# Patient Record
(demographics unavailable — no encounter records)

---

## 2024-10-19 NOTE — HISTORY OF PRESENT ILLNESS
[de-identified] : Fever [FreeTextEntry6] : Started 2 nights ago with stuffy and runny nose and fussy and restless sleep. Started to cough a bit.  Yesterday, after she came home from - had fever 100.5* and cranky and less appetite. Early this AM, T 100.5* and increased coughing- hacky and phlegmy cough- no gagging with the cough.  Increased nasal congestion. Occ pulling at her ears. No V/D/C/loose stools. No one else sick at home.  Possible sick contacts at .

## 2024-10-19 NOTE — DISCUSSION/SUMMARY
[FreeTextEntry1] : 7 mo/o F with LOM/Fever/ Fussy/ Cough/nasal congestion- Left cerumen impaction- ear wax removed- tolerated procedure well. Quick Strep negative Quick Flu negative for A and B Rapid COVID negative Amoxil 3.75 ml 2x/day with food for 10 days. May use Zarbees or Remedios's as needed. Increase clear fluids/ Steam/ Ices/Smoothies/Soups/Probiotics/Tylenol and/or Motrin as needed. Ques addressed. Father verbalizes understanding. Recheck in 2 weeks or sooner if needed. Check back if symptoms do not improve or worsen or if any questions/concerns. Time spent patient/chart - 34 mins.

## 2024-10-19 NOTE — REVIEW OF SYSTEMS
[Fussy] : fussy [Difficulty with Sleep] : difficulty with sleep [Fever] : fever [Nasal Discharge] : nasal discharge [Nasal Congestion] : nasal congestion [Cough] : cough [Appetite Changes] : appetite changes [Negative] : Skin

## 2024-10-19 NOTE — PHYSICAL EXAM
[No Acute Distress] : no acute distress [Alert] : alert [Normocephalic] : normocephalic [EOMI] : grossly EOMI [Clear] : right tympanic membrane clear [Clear Rhinorrhea] : clear rhinorrhea [Erythematous Oropharynx] : erythematous oropharynx [Supple] : supple [Clear to Auscultation Bilaterally] : clear to auscultation bilaterally [Wheezing] : no wheezing [Rhonchi] : no rhonchi [Regular Rate and Rhythm] : regular rate and rhythm [Soft] : soft [Moves All Extremities x 4] : moves all extremities x4 [Normotonic] : normotonic [Warm] : warm [FreeTextEntry3] : LOM

## 2024-10-22 NOTE — REVIEW OF SYSTEMS
[Nasal Congestion] : nasal congestion [Swollen Gums] : swollen gums [Appetite Changes] : appetite changes [Spitting Up] : spitting up [Negative] : Genitourinary

## 2024-10-23 NOTE — HISTORY OF PRESENT ILLNESS
[de-identified] : anaphylaxis to food [FreeTextEntry6] : 10/21/24 pt was taken to [facility] ER for allergic reaction.  ER note: Patient has a known milk and egg allergy. The patient was fed chicken and premade frozen vegetables and immediately had an outbreak of hives all around the body and had respiratory distress. Patient's mom states her daughters lips turned purple and she gave an Epipen at 1:20 p.m. and 5 mL of benadryl. The respiratory distress resolved and the baby was able to breathe more comfortably after the administration of the Epipen. The hives also improved once the ambulance had arrived. Upon presentation in the emergency room, the parents endorse that the rash has returned on the patients back, posterior neck, abdomen, legs, and genital region. The parents also endorse bumps on the top of the patients mouth. The parents endorse that the patient is currently being treated for a ear infection of the left ear with Amoxicillin over the past two days, since 10/19/24. Pt has apt with allergist at [facility] 10/23/24 as new pt. As per mother Allergist saw her today and vlad blood for allergies- .  Mother gave Amoxicillin Sunday and yesterday 10 AM.  She ate lunch then within 10 minutes of eating developed hives, lips were blue, wheezing and was given Epi Pen.  Ambulance was called and was brought to ER given Decadron.  Hx  Milk allergy and egg allergy as per mother when tested by allergist. Nasal congestion, no fever, cranky.  Spitting up due to mucus from nose.  Decreased appetite today.  No more hives.

## 2024-10-23 NOTE — HISTORY OF PRESENT ILLNESS
[de-identified] : anaphylaxis to food [FreeTextEntry6] : 10/21/24 pt was taken to [facility] ER for allergic reaction.  ER note: Patient has a known milk and egg allergy. The patient was fed chicken and premade frozen vegetables and immediately had an outbreak of hives all around the body and had respiratory distress. Patient's mom states her daughters lips turned purple and she gave an Epipen at 1:20 p.m. and 5 mL of benadryl. The respiratory distress resolved and the baby was able to breathe more comfortably after the administration of the Epipen. The hives also improved once the ambulance had arrived. Upon presentation in the emergency room, the parents endorse that the rash has returned on the patients back, posterior neck, abdomen, legs, and genital region. The parents also endorse bumps on the top of the patients mouth. The parents endorse that the patient is currently being treated for a ear infection of the left ear with Amoxicillin over the past two days, since 10/19/24. Pt has apt with allergist at [facility] 10/23/24 as new pt. As per mother Allergist saw her today and vlad blood for allergies- .  Mother gave Amoxicillin Sunday and yesterday 10 AM.  She ate lunch then within 10 minutes of eating developed hives, lips were blue, wheezing and was given Epi Pen.  Ambulance was called and was brought to ER given Decadron.  Hx  Milk allergy and egg allergy as per mother when tested by allergist. Nasal congestion, no fever, cranky.  Spitting up due to mucus from nose.  Decreased appetite today.  No more hives.

## 2024-10-23 NOTE — DISCUSSION/SUMMARY
[FreeTextEntry1] : 7 mo female with hx of milk and egg allergy here for hospital F/U of Anaphylaxis to food- on 10/21/24 10 min after eating chicken and mixed veg turned blue and wheezing and had hives- given Epi pen at home and taken by ambulance to  ER where she was given Decadron. She was also on Amoxicillin for LOM started 10/20/24 with congestion, cough.  The reaction was over 3 hours after the 3rd dose of Amoxicillin.  Pt seen by Allergist today and blood drawn for allergy testing.  She still has nasal congestion, cough, spitting up due to mucus. PE- LOS, nasal congestion, PNC cough, rest WNL.  Recommend to continue Amoxicillin x 10 d, nebulized saline PRN, saline and suction nose, small frequent meals.  Epi pen refill ordered.  F/U 11/01/24.

## 2024-10-23 NOTE — PHYSICAL EXAM
[Clear] : right tympanic membrane clear [Clear Effusion] : clear effusion [NL] : warm, clear [FreeTextEntry3] : LOS-cloudy effusion [FreeTextEntry4] : nasal congestion

## 2024-10-29 NOTE — HISTORY OF PRESENT ILLNESS
[de-identified] : ear recheck [FreeTextEntry6] : 8 m/o with hx of egg and milk allergy, with 2 recent episodes of anaphylaxis last one 10/21 as per mother she spoke with A&I who reports patient possible had allergic reaction to penicillin as patient started Amox on 9/19 for RAOM. Mother reports she only had 3 days worth of Amox and then stop as per A&I recommendations, since mother reports patient still fussy and pulling ear.

## 2024-11-05 NOTE — HISTORY OF PRESENT ILLNESS
[de-identified] : pulling ear and lesion on mouth [FreeTextEntry6] : 8 m/o with 1-day hx of pulling on Left ear and as per father he saw a sore on her mouth. Denies fever, vomiting, diarrhea , rash

## 2024-11-05 NOTE — HISTORY OF PRESENT ILLNESS
[de-identified] : pulling ear and lesion on mouth [FreeTextEntry6] : 8 m/o with 1-day hx of pulling on Left ear and as per father he saw a sore on her mouth. Denies fever, vomiting, diarrhea , rash

## 2024-12-06 NOTE — HISTORY OF PRESENT ILLNESS
[Well-balanced] : well-balanced [Normal] : Normal [Water heater temperature set at <120 degrees F] : Water heater temperature set at <120 degrees F [Rear facing car seat in  back seat] : Rear facing car seat in  back seat [Carbon Monoxide Detectors] : Carbon monoxide detectors [Smoke Detectors] : Smoke detectors [Mother] : mother [Formula ___ oz/feed] : [unfilled] oz of formula per feed [Formula ___ oz in 24 hrs] : [unfilled] oz of formula in 24 hours [Fruit] : fruit [Vegetables] : vegetables [Cereal] : cereal [Peanut] : peanut [Water] : water [___ voids per day] : [unfilled] voids per day [per day] : per day. [Loose] : loose consistency [In Crib] : sleeps in crib [On back] : sleeps on back [Wakes up at night] : wakes up at night [Pacifier use] : Pacifier use [Sippy Cup use] : sippy cup use [Toothpaste] : Primary Fluoride Source: Toothpaste [No] : Not at  exposure [Up to date] : Up to date [Meat] : no meat [Eggs] : no eggs [Fish] : no fish [Dairy] : no dairy [Co-sleeping] : no co-sleeping [Sleeps 12-16 hours per 24 hours (including naps)] : Does not sleep 12-16 hours per 24 hours (including naps) [Loose bedding, pillow, toys, and/or bumpers in crib] : no loose bedding, pillow, toys, and/or bumpers in crib [Bottle in bed] : not using bottle in bed [Brushing teeth] : not brushing teeth [Screen time only for video chatting] : screen time not just for video chatting [FreeTextEntry7] : No hospitalization/Surgeries, Specialist visit. [de-identified] : none [de-identified] : chicken,  [NO] : No

## 2024-12-06 NOTE — DEVELOPMENTAL MILESTONES
[Yes] : Completed. [Normal Development] : Normal Development [None] : none [Uses basic gestures] : uses basic gestures [Says "Jn" or "Mama"] : says "Jn" or "Mama" nonspecifically [Sits well without support] : sits well without support [Transitions between sitting and lying] : transitions between sitting and lying [Balances on hands and knees] : balances on hands and knees [Crawls] : crawls [Picks up small objects with 3 fingers] : picks up small objects with 3 fingers and thumb [Releases objects intentionally] : releases objects intentionally [Marble objects together] : bangs objects together [FreeTextEntry1] : Passed

## 2024-12-06 NOTE — DISCUSSION/SUMMARY
[Normal Growth] : growth [Normal Development] : development [None] : No known medical problems [No Elimination Concerns] : elimination [No Feeding Concerns] : feeding [No Skin Concerns] : skin [Normal Sleep Pattern] : sleep [Term Infant] : Term infant [Family Adaptation] : family adaptation [Infant Ray] : infant independence [Feeding Routine] : feeding routine [Safety] : safety [No Medications] : ~He/She~ is not on any medications [Parent/Guardian] : parent/guardian [] : The components of the vaccine(s) to be administered today are listed in the plan of care. The disease(s) for which the vaccine(s) are intended to prevent and the risks have been discussed with the caretaker.  The risks are also included in the appropriate vaccination information statements which have been provided to the patient's caregiver.  The caregiver has given consent to vaccinate.

## 2024-12-06 NOTE — PHYSICAL EXAM
[Alert] : alert [Normocephalic] : normocephalic [Flat Open Anterior [place]] : flat open anterior fontanelle [Red Reflex] : red reflex bilateral [PERRL] : PERRL [Normally Placed Ears] : normally placed ears [Auricles Well Formed] : auricles well formed [Clear Tympanic membranes] : clear tympanic membranes [Light reflex present] : light reflex present [Bony landmarks visible] : bony landmarks visible [Nares Patent] : nares patent [Palate Intact] : palate intact [Uvula Midline] : uvula midline [Supple, full passive range of motion] : supple, full passive range of motion [Symmetric Chest Rise] : symmetric chest rise [Clear to Auscultation Bilaterally] : clear to auscultation bilaterally [Regular Rate and Rhythm] : regular rate and rhythm [S1, S2 present] : S1, S2 present [+2 Femoral Pulses] : (+) 2 femoral pulses [Soft] : soft [Bowel Sounds] : bowel sounds present [Normal External Genitalia] : normal external genitalia [Normal Vaginal Introitus] : normal vaginal introitus [No Abnormal Lymph Nodes Palpated] : no abnormal lymph nodes palpated [Symmetric abduction and rotation of hips] : symmetric abduction and rotation of hips [Straight] : straight [Cranial Nerves Grossly Intact] : cranial nerves grossly intact [Acute Distress] : no acute distress [Excessive Tearing] : no excessive tearing [Discharge] : no discharge [Palpable Masses] : no palpable masses [Murmurs] : no murmurs [Tender] : nontender [Distended] : nondistended [Hepatomegaly] : no hepatomegaly [Splenomegaly] : no splenomegaly [Clitoromegaly] : no clitoromegaly [Allis Sign] : negative Allis sign [Rash or Lesions] : no rash/lesions

## 2024-12-09 NOTE — HISTORY OF PRESENT ILLNESS
[EENT/Resp Symptoms] : EENT/RESPIRATORY SYMPTOMS [Nasal congestion] : nasal congestion [Runny nose] : runny nose [Chest congestion] : chest congestion [___ Day(s)] : [unfilled] day(s) [Constant] : constant [Irritable] : irritable [Known exposure to COVID-19] : no known exposure to COVID-19 [Hx of recent COVID-19 infection] : no history of recent COVID-19 infection [Sick Contacts: ___] : no sick contacts [Clear rhinorrhea] : clear rhinorrhea [Wet cough] : wet cough [At Night] : at night [In Morning] : in morning [Humidifier] : humidifier [Nasal saline] : nasal saline [Nasal suctioning] : nasal suctioning [Acetaminophen] : acetaminophen [Ibuprofen] : ibuprofen [Change in sleep pattern] : change in sleep pattern [Eye Redness] : no eye redness [Eye Discharge] : no eye discharge [Ear Tugging] : no ear tugging [Runny Nose] : runny nose [Nasal Congestion] : nasal congestion [Teething] : no teething [Cough] : cough [Wheezing] : wheezing [Decreased Appetite] : decreased appetite [Posttussive emesis] : no posttussive emesis [Vomiting] : no vomiting [Diarrhea] : no diarrhea [Decreased Urine Output] : no decreased urine output [Rash] : no rash [Stable] : stable

## 2024-12-11 NOTE — HISTORY OF PRESENT ILLNESS
[de-identified] : RSV bronchiolitis  [FreeTextEntry6] : 9 m/o with DOI 3, with RSV + bronchiolitis, on albuterol and orapred  for 2 days as per mother last night patient breathing fast and decrease appetite.

## 2024-12-11 NOTE — PHYSICAL EXAM
[Alert] : alert [Irritable] : irritable [Consolable] : consolable [Rhonchi] : rhonchi [Subcostal Retractions] : subcostal retractions [NL] : warm, clear

## 2024-12-13 NOTE — HISTORY OF PRESENT ILLNESS
[de-identified] : bronchiolitis recheck [FreeTextEntry6] : 9 m/o with RSV + bronchiolitis on albuterol nebs q4hr, orapred to complete 5 days and Zithromax to complete 5 days, still coughing, congestion and decrease appetite.

## 2024-12-20 NOTE — HISTORY OF PRESENT ILLNESS
[de-identified] : lungs f/u [FreeTextEntry6] : 9 m/o with RSV pneumonia s/p albuterol nebulizer treatment, orapred and Zithromax doing better still congestion and decrease appetitie.

## 2025-01-09 NOTE — REVIEW OF SYSTEMS
[Fussy] : fussy [Malaise] : malaise [Difficulty with Sleep] : difficulty with sleep [Fever] : fever [Nasal Congestion] : nasal congestion [Cough] : cough [Appetite Changes] : appetite changes [Negative] : Skin

## 2025-01-09 NOTE — DISCUSSION/SUMMARY
[FreeTextEntry1] : 10 mo/o F with Viral illness/Fever/Fussy/Malaise/Nasal congestion- Quick Strep negative Quick Flu negative for A and B Rapid COVID negative T/C sent Flu panel sent May use Zarbees or Remedios's as needed. Increase clear fluids/ Lukewarm sponge baths/Pedialyte// Ices/Smoothies/Soups/Probiotics/Tylenol and/or Motrin as needed. Ques addressed. Mother verbalizes understanding. Check back if symptoms do not improve or worsen or if any questions/concerns. Time spent patient/chart - 34 mins. Jeannette

## 2025-01-09 NOTE — HISTORY OF PRESENT ILLNESS
[de-identified] : Fever [FreeTextEntry6] : Started yesterday being a little cranky and little restless sleep and whining and was 102.6* at 6 AM. She seemed achy.  Started to have a hacky cough and some nasal congestion. Has been pulling at her ears.  Decreased appetite. No V/D/C/loose stools. She has had exposure to COVID and Flu.

## 2025-01-09 NOTE — PHYSICAL EXAM
[Alert] : alert [EOMI] : grossly EOMI [Clear] : right tympanic membrane clear [Clear Rhinorrhea] : clear rhinorrhea [Erythematous Oropharynx] : erythematous oropharynx [Clear to Auscultation Bilaterally] : clear to auscultation bilaterally [Regular Rate and Rhythm] : regular rate and rhythm [Soft] : soft [Moves All Extremities x 4] : moves all extremities x4 [Normotonic] : normotonic [Warm] : warm [No Acute Distress] : no acute distress [Supple] : not supple [Wheezing] : no wheezing [Rales] : no rales [Rhonchi] : no rhonchi [Tender] : nontender

## 2025-01-15 NOTE — BEGINNING OF VISIT
Florinda informed with message listed below and understands verbal instructions given.  Florinda said that she will relay message to group home.   [Mother] : mother

## 2025-01-15 NOTE — HISTORY OF PRESENT ILLNESS
[de-identified] : Flu A f/u [FreeTextEntry6] : 10 m/o seen 1/9 for Flu A as well went to ER  1/10 to [facility] for high fever, no testing done. Denies fever, or cough but still with decreased appetite, tolerating fluids and milk.

## 2025-01-15 NOTE — HISTORY OF PRESENT ILLNESS
[de-identified] : Flu A f/u [FreeTextEntry6] : 10 m/o seen 1/9 for Flu A as well went to ER  1/10 to [facility] for high fever, no testing done. Denies fever, or cough but still with decreased appetite, tolerating fluids and milk.

## 2025-03-01 NOTE — DISCUSSION/SUMMARY
[FreeTextEntry1] : 12 mo/o F with LOM/Fussy/Cough/Nasal congestion- Omnicef 250 mg/tsp 3 ml 1x/day with food for 10 days. May use Zarbees or Remedios's as needed. May use NS in nebulizer as needed Increase clear fluids/Steam/Lukewarm sponge baths/ Ices/Smoothies/Soups/Probiotics/Tylenol and/or Motrin as needed. Ques addressed. Mother verbalizes understanding. Recheck in 2 weeks or sooner if needed. Check back if symptoms do not improve or worsen or if any questions/concerns. Time spent patient/chart - 33 mins.

## 2025-03-01 NOTE — PHYSICAL EXAM
[Acute Distress] : no acute distress [Alert] : alert [Normocephalic] : normocephalic [EOMI] : grossly EOMI [Clear Rhinorrhea] : clear rhinorrhea [Erythematous Oropharynx] : nonerythematous oropharynx [Supple] : supple [Clear to Auscultation Bilaterally] : clear to auscultation bilaterally [Wheezing] : no wheezing [Rales] : no rales [Rhonchi] : no rhonchi [Regular Rate and Rhythm] : regular rate and rhythm [Soft] : soft [Moves All Extremities x 4] : moves all extremities x4 [Normotonic] : normotonic [Warm] : warm [FreeTextEntry3] : LOM- noted after cerumen removal/Right NL

## 2025-03-01 NOTE — HISTORY OF PRESENT ILLNESS
[de-identified] : Fever/Cough/Congestion [FreeTextEntry6] : Started yesterday with hacky and phlegmy cough and started to get a stuffy and runny nose. Fever 4 days ago after Prevnar to 100.8* X 1. Restless sleep. Occ gagging with the cough.  Mother has had a cold. No pulling at her ears. Little fussy. No V/D/C/loose stools. Less appetite. Had CP 4 days ago.

## 2025-03-01 NOTE — REVIEW OF SYSTEMS
[Fussy] : fussy [Difficulty with Sleep] : difficulty with sleep [Nasal Discharge] : nasal discharge [Nasal Congestion] : nasal congestion [Cough] : cough [Appetite Changes] : appetite changes [Negative] : Skin

## 2025-03-07 NOTE — HISTORY OF PRESENT ILLNESS
[Normal] : Normal [Water heater temperature set at <120 degrees F] : Water heater temperature set at <120 degrees F [Car seat in back seat] : Car seat in back seat [Smoke Detectors] : Smoke detectors [Carbon Monoxide Detectors] : Carbon monoxide detectors [Mother] : mother [Formula ___ oz/feed] : [unfilled] oz of formula per feed [Cow's milk ___ oz/feed] : [unfilled] oz of Cow's milk per feed [Fruit] : fruit [Vegetables] : vegetables [Meat] : meat [Baby food] : baby food [Finger food] : finger food [Table food] : table food [___ stools per day] : [unfilled]  stools per day [___ stools every other day] : [unfilled]  stools every other day [___ voids per day] : [unfilled] voids per day [In crib] : In crib [Wakes up at night] : Wakes up at night [Sippy cup use] : Sippy cup use [Toothpaste] : Primary Fluoride Source: Toothpaste [No] : Not at  exposure [Up to date] : Up to date [NO] : No [Playtime] : Playtime  [At risk for exposure to TB] : Not at risk for exposure to Tuberculosis [FreeTextEntry7] : No hospitalization/Surgeries, Specialist visit. [de-identified] : nasal congestion,

## 2025-03-07 NOTE — PHYSICAL EXAM
[Alert] : alert [Normocephalic] : normocephalic [Closed Anterior [place]] : closed anterior fontanelle [Red Reflex] : red reflex bilateral [PERRL] : PERRL [Normally Placed Ears] : normally placed ears [Auricles Well Formed] : auricles well formed [Clear Tympanic membranes] : clear tympanic membranes [Light reflex present] : light reflex present [Bony landmarks visible] : bony landmarks visible [Nares Patent] : nares patent [Palate Intact] : palate intact [Uvula Midline] : uvula midline [Tooth Eruption] : tooth eruption [Supple, full passive range of motion] : supple, full passive range of motion [Symmetric Chest Rise] : symmetric chest rise [Clear to Auscultation Bilaterally] : clear to auscultation bilaterally [Regular Rate and Rhythm] : regular rate and rhythm [S1, S2 present] : S1, S2 present [+2 Femoral Pulses] : (+) 2 femoral pulses [Soft] : soft [Bowel Sounds] : normoactive bowel sounds [Normal External Genitalia] : normal external genitalia [Normal Vaginal Introitus] : normal vaginal introitus [No Abnormal Lymph Nodes Palpated] : no abnormal lymph nodes palpated [Symmetric Abduction and Rotation of Hips] : symmetric abduction and rotation of hips [Straight] : straight [Cranial Nerves Grossly Intact] : cranial nerves grossly intact [Discharge] : no discharge [Palpable Masses] : no palpable masses [Murmurs] : no murmurs [Tender] : nontender [Distended] : nondistended [Hepatomegaly] : no hepatomegaly [Splenomegaly] : no splenomegaly [Clitoromegaly] : no clitoromegaly [Allis Sign] : negative Allis sign [Rash or Lesions] : no rash/lesions

## 2025-03-07 NOTE — PHYSICAL EXAM
[Alert] : alert [Normocephalic] : normocephalic [Closed Anterior [place]] : closed anterior fontanelle [Red Reflex] : red reflex bilateral [PERRL] : PERRL [Normally Placed Ears] : normally placed ears [Auricles Well Formed] : auricles well formed [Clear Tympanic membranes] : clear tympanic membranes [Light reflex present] : light reflex present [Bony landmarks visible] : bony landmarks visible [Nares Patent] : nares patent [Palate Intact] : palate intact [Uvula Midline] : uvula midline [Tooth Eruption] : tooth eruption [Supple, full passive range of motion] : supple, full passive range of motion [Symmetric Chest Rise] : symmetric chest rise [Clear to Auscultation Bilaterally] : clear to auscultation bilaterally [Regular Rate and Rhythm] : regular rate and rhythm [S1, S2 present] : S1, S2 present [+2 Femoral Pulses] : (+) 2 femoral pulses [Soft] : soft [Bowel Sounds] : normoactive bowel sounds [Normal External Genitalia] : normal external genitalia [Normal Vaginal Introitus] : normal vaginal introitus [No Abnormal Lymph Nodes Palpated] : no abnormal lymph nodes palpated [Symmetric Abduction and Rotation of Hips] : symmetric abduction and rotation of hips [Straight] : straight [Cranial Nerves Grossly Intact] : cranial nerves grossly intact [Discharge] : no discharge [Palpable Masses] : no palpable masses [Murmurs] : no murmurs [Tender] : nontender [Distended] : nondistended [Hepatomegaly] : no hepatomegaly [Splenomegaly] : no splenomegaly [Clitoromegaly] : no clitoromegaly [Allis Sign] : negative Allis sign [Rash or Lesions] : no rash/lesions

## 2025-03-07 NOTE — HISTORY OF PRESENT ILLNESS
[Normal] : Normal [Water heater temperature set at <120 degrees F] : Water heater temperature set at <120 degrees F [Car seat in back seat] : Car seat in back seat [Smoke Detectors] : Smoke detectors [Carbon Monoxide Detectors] : Carbon monoxide detectors [Mother] : mother [Formula ___ oz/feed] : [unfilled] oz of formula per feed [Cow's milk ___ oz/feed] : [unfilled] oz of Cow's milk per feed [Fruit] : fruit [Vegetables] : vegetables [Meat] : meat [Baby food] : baby food [Finger food] : finger food [Table food] : table food [___ stools per day] : [unfilled]  stools per day [___ stools every other day] : [unfilled]  stools every other day [___ voids per day] : [unfilled] voids per day [In crib] : In crib [Wakes up at night] : Wakes up at night [Sippy cup use] : Sippy cup use [Toothpaste] : Primary Fluoride Source: Toothpaste [No] : Not at  exposure [Up to date] : Up to date [NO] : No [Playtime] : Playtime  [At risk for exposure to TB] : Not at risk for exposure to Tuberculosis [FreeTextEntry7] : No hospitalization/Surgeries, Specialist visit. [de-identified] : nasal congestion,

## 2025-03-07 NOTE — DEVELOPMENTAL MILESTONES
[Normal Development] : Normal Development [None] : none [Looks for hidden objects] : looks for hidden objects [Imitates new gestures] : imitates new gestures [Says "Dad" or "Mom" with meaning] : says "Dad" or "Mom" with meaning [Uses one word other than Mom or] : uses one word other than Mom or Dad or personal names [Follows a verbal command that] : follows a verbal command that includes a gesture [Stands without support] : stands without support [Drops object in a cup] : drops object in a cup [Picks up small object with 2 finger] : picks up small object with 2 finger pincer grasp [Picks up food and eats it] : picks up food and eats it [Yes] : Completed. [Takes first independent] : does not take first independent steps [FreeTextEntry1] : Passed

## 2025-03-07 NOTE — HISTORY OF PRESENT ILLNESS
[Normal] : Normal [Water heater temperature set at <120 degrees F] : Water heater temperature set at <120 degrees F [Car seat in back seat] : Car seat in back seat [Smoke Detectors] : Smoke detectors [Carbon Monoxide Detectors] : Carbon monoxide detectors [Mother] : mother [Formula ___ oz/feed] : [unfilled] oz of formula per feed [Cow's milk ___ oz/feed] : [unfilled] oz of Cow's milk per feed [Fruit] : fruit [Vegetables] : vegetables [Meat] : meat [Baby food] : baby food [Finger food] : finger food [Table food] : table food [___ stools per day] : [unfilled]  stools per day [___ stools every other day] : [unfilled]  stools every other day [___ voids per day] : [unfilled] voids per day [In crib] : In crib [Wakes up at night] : Wakes up at night [Sippy cup use] : Sippy cup use [Toothpaste] : Primary Fluoride Source: Toothpaste [No] : Not at  exposure [Up to date] : Up to date [NO] : No [Playtime] : Playtime  [At risk for exposure to TB] : Not at risk for exposure to Tuberculosis [FreeTextEntry7] : No hospitalization/Surgeries, Specialist visit. [de-identified] : nasal congestion,

## 2025-03-07 NOTE — PHYSICAL EXAM
[Alert] : alert [Normocephalic] : normocephalic [Closed Anterior [place]] : closed anterior fontanelle [Red Reflex] : red reflex bilateral [PERRL] : PERRL [Normally Placed Ears] : normally placed ears [Auricles Well Formed] : auricles well formed [Clear Tympanic membranes] : clear tympanic membranes [Light reflex present] : light reflex present [Bony landmarks visible] : bony landmarks visible [Nares Patent] : nares patent [Palate Intact] : palate intact [Uvula Midline] : uvula midline [Tooth Eruption] : tooth eruption [Supple, full passive range of motion] : supple, full passive range of motion [Symmetric Chest Rise] : symmetric chest rise [Clear to Auscultation Bilaterally] : clear to auscultation bilaterally [Regular Rate and Rhythm] : regular rate and rhythm [S1, S2 present] : S1, S2 present [+2 Femoral Pulses] : (+) 2 femoral pulses [Soft] : soft [Bowel Sounds] : normoactive bowel sounds [Normal External Genitalia] : normal external genitalia [Normal Vaginal Introitus] : normal vaginal introitus [No Abnormal Lymph Nodes Palpated] : no abnormal lymph nodes palpated [Symmetric Abduction and Rotation of Hips] : symmetric abduction and rotation of hips [Straight] : straight [Cranial Nerves Grossly Intact] : cranial nerves grossly intact [Discharge] : no discharge [Palpable Masses] : no palpable masses [Murmurs] : no murmurs [Tender] : nontender [Distended] : nondistended [Hepatomegaly] : no hepatomegaly [Splenomegaly] : no splenomegaly [Clitoromegaly] : no clitoromegaly [Allis Sign] : negative Allis sign [Rash or Lesions] : no rash/lesions

## 2025-03-07 NOTE — CARE PLAN
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 Address parent questions and concerns regarding the introduction of allergenic foods. Collaborate with the parent and Dr. Sandhu to provide comprehensive, patient- and family-centered care Possible barriers, mother hesitant to follow up with specialist recommendation before discussing with PCP.

## 2025-03-07 NOTE — HISTORY OF PRESENT ILLNESS
[Normal] : Normal [Water heater temperature set at <120 degrees F] : Water heater temperature set at <120 degrees F [Car seat in back seat] : Car seat in back seat [Smoke Detectors] : Smoke detectors [Carbon Monoxide Detectors] : Carbon monoxide detectors [Mother] : mother [Formula ___ oz/feed] : [unfilled] oz of formula per feed [Cow's milk ___ oz/feed] : [unfilled] oz of Cow's milk per feed [Fruit] : fruit [Vegetables] : vegetables [Meat] : meat [Baby food] : baby food [Finger food] : finger food [Table food] : table food [___ stools per day] : [unfilled]  stools per day [___ stools every other day] : [unfilled]  stools every other day [___ voids per day] : [unfilled] voids per day [In crib] : In crib [Wakes up at night] : Wakes up at night [Sippy cup use] : Sippy cup use [Toothpaste] : Primary Fluoride Source: Toothpaste [No] : Not at  exposure [Up to date] : Up to date [NO] : No [Playtime] : Playtime  [At risk for exposure to TB] : Not at risk for exposure to Tuberculosis [FreeTextEntry7] : No hospitalization/Surgeries, Specialist visit. [de-identified] : nasal congestion,

## 2025-03-07 NOTE — DISCUSSION/SUMMARY
[Normal Growth] : growth [Normal Development] : development [None] : No known medical problems [No Elimination Concerns] : elimination [No Feeding Concerns] : feeding [No Skin Concerns] : skin [Normal Sleep Pattern] : sleep [Family Support] : family support [Establishing Routines] : establishing routines [Feeding and Appetite Changes] : feeding and appetite changes [Establishing A Dental Home] : establishing a dental home [Safety] : safety [No Medications] : ~He/She~ is not on any medications [Parent/Guardian] : parent/guardian [] : The components of the vaccine(s) to be administered today are listed in the plan of care. The disease(s) for which the vaccine(s) are intended to prevent and the risks have been discussed with the caretaker.  The risks are also included in the appropriate vaccination information statements which have been provided to the patient's caregiver.  The caregiver has given consent to vaccinate. [FreeTextEntry1] : When Angelica passed the milk and egg challenges, you can gradually introduce these foods into their regular diet. Discussed to Start by offering a small amount of milk or egg daily, gradually increasing the portion size over time as tolerated.  Be sure to continue monitoring for any allergic reactions and contact Dr Sandhu if you have any concerns. Continue on Epipen if needed Advised to call [facility] office to schedule an appointment

## 2025-03-07 NOTE — PHYSICAL EXAM
[Alert] : alert [Normocephalic] : normocephalic [Closed Anterior [place]] : closed anterior fontanelle [Red Reflex] : red reflex bilateral [PERRL] : PERRL [Normally Placed Ears] : normally placed ears [Auricles Well Formed] : auricles well formed [Clear Tympanic membranes] : clear tympanic membranes [Light reflex present] : light reflex present [Bony landmarks visible] : bony landmarks visible [Nares Patent] : nares patent [Palate Intact] : palate intact [Uvula Midline] : uvula midline [Tooth Eruption] : tooth eruption [Supple, full passive range of motion] : supple, full passive range of motion [Symmetric Chest Rise] : symmetric chest rise [Clear to Auscultation Bilaterally] : clear to auscultation bilaterally [Regular Rate and Rhythm] : regular rate and rhythm [S1, S2 present] : S1, S2 present [+2 Femoral Pulses] : (+) 2 femoral pulses [Soft] : soft [Bowel Sounds] : normoactive bowel sounds [Normal External Genitalia] : normal external genitalia [Normal Vaginal Introitus] : normal vaginal introitus [No Abnormal Lymph Nodes Palpated] : no abnormal lymph nodes palpated [Symmetric Abduction and Rotation of Hips] : symmetric abduction and rotation of hips [Straight] : straight [Cranial Nerves Grossly Intact] : cranial nerves grossly intact [Discharge] : no discharge [Palpable Masses] : no palpable masses [Murmurs] : no murmurs [Tender] : nontender [Distended] : nondistended [Hepatomegaly] : no hepatomegaly [Splenomegaly] : no splenomegaly [Clitoromegaly] : no clitoromegaly [Allis Sign] : negative Allis sign [Rash or Lesions] : no rash/lesions

## 2025-03-17 NOTE — HISTORY OF PRESENT ILLNESS
[FreeTextEntry6] : He for ear rechecks last seen31/2025 for LOM s/Omnicef 250 mg/tsp 3 ml 1x/day with food for 10 days. mother reports patient did not tolerate the medication well and was spitting out the medication and maybe she got 3 days total worth of antibiotic

## 2025-03-21 NOTE — HISTORY OF PRESENT ILLNESS
[de-identified] : ear recheck [FreeTextEntry6] : 12 m/o last seen 3/18 for LAOM previously seen 3/1 for same reason but patient did not tolerate Cefdinir when seen 3/18 patient still with LAOM so changed antibiotic to Azithromycin now symptoms resolved today d4/5

## 2025-05-05 NOTE — HISTORY OF PRESENT ILLNESS
[Fever] : FEVER [___ Day(s)] : [unfilled] day(s) [Intermittent] : intermittent [Irritable] : irritable [At Night] : at night [Acetaminophen] : acetaminophen [Change in sleep pattern] : change in sleep pattern [Stable] : stable [Known Exposure to COVID-19] : no known exposure to COVID-19 [Hx of recent COVID-19 infection] : no history of recent COVID-19 infection [Eye Redness] : no eye redness [Eye Discharge] : no eye discharge [Ear Tugging] : no ear tugging [Runny Nose] : no runny nose [Nasal Congestion] : no nasal congestion [Teething] : no teething [Cough] : no cough [Wheezing] : no wheezing [Decreased Appetite] : no decreased appetite [Vomiting] : no vomiting [Diarrhea] : no diarrhea [Rash] : no rash

## 2025-05-19 NOTE — HISTORY OF PRESENT ILLNESS
[de-identified] : ears recheck  [FreeTextEntry6] : 14 m/o with hx of AOM s/p Zithromax for 5 days now asymptomatic.

## 2025-05-27 NOTE — DEVELOPMENTAL MILESTONES
[Normal Development] : Normal Development [Yes] : Completed. [Imitates scribbling] : imitates scribbling [Drinks from cup with little] : drinks from cup with little spilling [Points to ask for something] : points to ask for something or to get help [Uses 3 words other than names] : uses 3 words other than names [Speaks in sounds that seem like] : speaks in sounds that seem like an unknown language [Follows directions that do not] : follows direction that do not include a gesture [Looks when parent says,] : looks when parent says, "Where is...?" [Squats to  objects] : squats to  objects [Crawls up a few steps] : crawls up a few steps [Begins to run] : begins to run [Makes clarence with crayon] : makes clarence with viancayon [Drops object into and takes object] : drops object into and takes object out of container [None] : none [FreeTextEntry1] : Passed Length To Time In Minutes Device Was In Place: 10

## 2025-05-27 NOTE — PHYSICAL EXAM
[Alert] : alert [No Acute Distress] : no acute distress [Normocephalic] : normocephalic [Anterior [place] Closed] : anterior fontanelle closed [Red Reflex Bilateral] : red reflex bilateral [PERRL] : PERRL [Normally Placed Ears] : normally placed ears [Auricles Well Formed] : auricles well formed [Clear Tympanic membranes with present light reflex and bony landmarks] : clear tympanic membranes with present light reflex and bony landmarks [No Discharge] : no discharge [Nares Patent] : nares patent [Palate Intact] : palate intact [Uvula Midline] : uvula midline [Tooth Eruption] : tooth eruption  [Supple, full passive range of motion] : supple, full passive range of motion [No Palpable Masses] : no palpable masses [Symmetric Chest Rise] : symmetric chest rise [Clear to Auscultation Bilaterally] : clear to auscultation bilaterally [Regular Rate and Rhythm] : regular rate and rhythm [S1, S2 present] : S1, S2 present [No Murmurs] : no murmurs [+2 Femoral Pulses] : +2 femoral pulses [Soft] : soft [NonTender] : non tender [Non Distended] : non distended [Normoactive Bowel Sounds] : normoactive bowel sounds [No Hepatomegaly] : no hepatomegaly [No Splenomegaly] : no splenomegaly [Sylvain 1] : Sylvain 1 [No Clitoromegaly] : no clitoromegaly [Normal Vaginal Introitus] : normal vaginal introitus [Patent] : patent [Normally Placed] : normally placed [No Abnormal Lymph Nodes Palpated] : no abnormal lymph nodes palpated [No Clavicular Crepitus] : no clavicular crepitus [Negative Goldman-Ortalani] : negative Goldman-Ortalani [Symmetric Buttocks Creases] : symmetric buttocks creases [No Spinal Dimple] : no spinal dimple [NoTuft of Hair] : no tuft of hair [Cranial Nerves Grossly Intact] : cranial nerves grossly intact [de-identified] : +  dry skin

## 2025-05-27 NOTE — HISTORY OF PRESENT ILLNESS
[Normal] : Normal [Water heater temperature set at <120 degrees F] : Water heater temperature set at <120 degrees F [Car seat in back seat] : Car seat in back seat [Carbon Monoxide Detectors] : Carbon monoxide detectors [Smoke Detectors] : Smoke detectors [Mother] : mother [Formula (Ounces per day ___)] : consumes [unfilled] oz of formula per day [Fruit] : fruit [Vegetables] : vegetables [Meat] : meat [Cereal] : cereal [Finger Foods] : finger foods [Table food] : table food [___ stools per day] : [unfilled]  stools per day [___ stools every other day] : [unfilled]  stools every other day [Loose] : loose consistency [In crib] : In crib [Brushing teeth] : Brushing teeth [Toothpaste] : Primary Fluoride Source: Toothpaste [Playtime] : Playtime [Temper Tantrums] : Temper tantrums [No] : Not at  exposure [Up to date] : Up to date [NO] : No [FreeTextEntry7] : No hospitalization/Surgeries, Specialist visit. [de-identified] : None

## 2025-05-27 NOTE — DISCUSSION/SUMMARY
[Normal Growth] : growth [Normal Development] : development [None] : No known medical problems [No Elimination Concerns] : elimination [No Feeding Concerns] : feeding [Normal Sleep Pattern] : sleep [No Medications] : ~He/She~ is not on any medications [Parent/Guardian] : parent/guardian [Eczema] : eczema [Communication and Social Development] : communication and social development [Sleep Routines and Issues] : sleep routines and issues [Temper Tantrums and Discipline] : temper tantrums and discipline [Healthy Teeth] : healthy teeth [Safety] : safety [FreeTextEntry1] :  Give your child a bath (or shower) every day or every other day for 5-10 minutes in lukewarm water. No soap is needed, but a gentle non-soap cleanser can be used on the sweaty areas (armpits, neck, groin) and on the hands and feet. Use only fragrance-free, hypoallergenic cleansers. Avoid scrubbing your child's skin with anything rough. Don't use bubble bath in the bath water.  Pat your child's skin dry after the bath or shower. If your doctor has prescribed any topical medicines, apply these to the areas of rash (BEFORE applying any moisturizers).  Apply a moisturizer to the whole body immediately after bathing (while the skin is still damp). This helps lock in" the moisture of the water. The thicker the moisturizer, the better it will work. Ointments such as petroleum jelly (or a similar greasy" alternative) are good choices. Most importantly, find a moisturizer that your child likes to use. Moisturizers should be applied every day, even when the rash is gone.  Dress your child in soft fabrics like 100% cotton. Use mild, fragrance-free laundry detergents. Don't use fabric softeners or fabric sheets in the dryer.  Topical steroid prn for atopic dermatitis. [] : The components of the vaccine(s) to be administered today are listed in the plan of care. The disease(s) for which the vaccine(s) are intended to prevent and the risks have been discussed with the caretaker.  The risks are also included in the appropriate vaccination information statements which have been provided to the patient's caregiver.  The caregiver has given consent to vaccinate.

## 2025-06-03 NOTE — HISTORY OF PRESENT ILLNESS
[EENT/Resp Symptoms] : EENT/RESPIRATORY SYMPTOMS [Nasal congestion] : nasal congestion [Runny nose] : runny nose [___ Day(s)] : [unfilled] day(s) [Constant] : constant [Known Exposure to COVID-19] : no known exposure to COVID-19 [Hx of recent COVID-19 infection] : no history of recent COVID-19 infection [Sick Contacts: ___] : no sick contacts [Clear rhinorrhea] : clear rhinorrhea [Dry cough] : dry cough [At Night] : at night [In Morning] : in morning [Humidifier] : humidifier [Nasal saline] : nasal saline [Nasal suctioning] : nasal suctioning [Fever] : no fever [Change in sleep pattern] : change in sleep pattern [Eye Redness] : no eye redness [Eye Discharge] : no eye discharge [Eye Itching] : no eye itching [Ear Tugging] : no ear tugging [Runny Nose] : runny nose [Nasal Congestion] : nasal congestion [Teething] : no teething [Cough] : no cough [Wheezing] : no wheezing [Decreased Appetite] : no decreased appetite [Posttussive emesis] : no posttussive emesis [Vomiting] : no vomiting [Diarrhea] : no diarrhea [Rash] : no rash [Stable] : stable

## 2025-06-21 NOTE — PHYSICAL EXAM
[Clear Rhinorrhea] : clear rhinorrhea [Tooth Eruption] : tooth eruption  [Wheezing] : wheezing [NL] : moves all extremities x4, warm, well perfused x4 [de-identified] : seborrhea scalp and behind ears

## 2025-06-21 NOTE — DISCUSSION/SUMMARY
[FreeTextEntry1] : 15 mo female with URI, RAD, cough, seborrhea.  QS neg, throat cx and RVP sent. Hydrocortisone BID behind ears, Ketoconazole shampoo 2x/wk,   Increase fluids, saline, suction, humidifier, Albuterol Q 4 hours, Prednisolone (15 mg/5 ml) 7.5 ml QD then 3.75 ml BID x 4 d, Zarbees.  Recheck 1 week if still coughing, sooner if symptoms change or worsen.

## 2025-06-21 NOTE — REVIEW OF SYSTEMS
[Difficulty with Sleep] : difficulty with sleep [Nasal Discharge] : nasal discharge [Nasal Congestion] : nasal congestion [Cough] : cough [Appetite Changes] : appetite changes [Rash] : rash [Itching] : itching [Seborrhea] : seborrhea [Negative] : Genitourinary [Fever] : no fever

## 2025-06-21 NOTE — HISTORY OF PRESENT ILLNESS
[de-identified] : cough [FreeTextEntry6] : 3 d ago started with a clear runny and stuffy nose, light cough, worse at night, unable to sleep last night-mother hear wheezing, did Jay Vapr rub, no help. Given Tylenol.  3 teeth coming in has 4 teeth.  No fever, No V/D, decreased appetite.  Drinking milk.  Itchy scalp and rash behind ears.

## 2025-06-23 NOTE — DISCUSSION/SUMMARY
[FreeTextEntry1] : 15 mo female with Entero/rhinovirus, fever, URI, cough, RAD.  Throat Cx neg. Continue 120 mg Acetaminophen IN (given in office x 1) or Tylenol 160 mg po Q 4 hours.  If eating and not vomiting may give Motrin 100 mg with food Q 6 hours PRN.  Luke warm bath or luke warm compresses to help with fever.   Albuterol Q 4-6 hours PRN.  Do not give Prednisolone if not eating or vomiting.  Monitor hydration, UO and tears.  Give Pedialyte if not eating and drinking or if vomiting persists.

## 2025-06-23 NOTE — HISTORY OF PRESENT ILLNESS
[de-identified] : fever, nasal congestion, cough [FreeTextEntry6] : Pt seen 2 d ago 15 mo female with URI, RAD, cough, seborrhea. QS neg, throat cx neg and RVP E/R +. Hydrocortisone BID behind ears, Ketoconazole shampoo 2x/wk, Increase fluids, saline, suction, humidifier, Albuterol Q 4 hours, Prednisolone (15 mg/5 ml) 7.5 ml QD then 3.75 ml BID x 4 d, Zarbees. Recheck 1 week if still coughing, sooner if symptoms change or worsen. Fever started over night T max 105 given Motrin once then vomited immediately after, after used Acetaminophen supp.  Drank 4 oz milk.  Refusing steroid, nose better, cough same.  No sign of respiratory distress.

## 2025-06-23 NOTE — DISCUSSION/SUMMARY
[FreeTextEntry1] : 15 mo female with Entero/rhinovirus, fever, URI, cough, RAD.  Throat Cx neg. Continue 120 mg Acetaminophen MO (given in office x 1) or Tylenol 160 mg po Q 4 hours.  If eating and not vomiting may give Motrin 100 mg with food Q 6 hours PRN.  Luke warm bath or luke warm compresses to help with fever.   Albuterol Q 4-6 hours PRN.  Do not give Prednisolone if not eating or vomiting.  Monitor hydration, UO and tears.  Give Pedialyte if not eating and drinking or if vomiting persists.

## 2025-06-23 NOTE — REVIEW OF SYSTEMS
[Fever] : fever [Malaise] : malaise [Nasal Congestion] : nasal congestion [Cough] : cough [Appetite Changes] : appetite changes [Vomiting] : vomiting [Negative] : Genitourinary

## 2025-06-23 NOTE — HISTORY OF PRESENT ILLNESS
[de-identified] : fever, nasal congestion, cough [FreeTextEntry6] : Pt seen 2 d ago 15 mo female with URI, RAD, cough, seborrhea. QS neg, throat cx neg and RVP E/R +. Hydrocortisone BID behind ears, Ketoconazole shampoo 2x/wk, Increase fluids, saline, suction, humidifier, Albuterol Q 4 hours, Prednisolone (15 mg/5 ml) 7.5 ml QD then 3.75 ml BID x 4 d, Zarbees. Recheck 1 week if still coughing, sooner if symptoms change or worsen. Fever started over night T max 105 given Motrin once then vomited immediately after, after used Acetaminophen supp.  Drank 4 oz milk.  Refusing steroid, nose better, cough same.  No sign of respiratory distress.

## 2025-06-23 NOTE — HISTORY OF PRESENT ILLNESS
[de-identified] : fever, nasal congestion, cough [FreeTextEntry6] : Pt seen 2 d ago 15 mo female with URI, RAD, cough, seborrhea. QS neg, throat cx neg and RVP E/R +. Hydrocortisone BID behind ears, Ketoconazole shampoo 2x/wk, Increase fluids, saline, suction, humidifier, Albuterol Q 4 hours, Prednisolone (15 mg/5 ml) 7.5 ml QD then 3.75 ml BID x 4 d, Zarbees. Recheck 1 week if still coughing, sooner if symptoms change or worsen. Fever started over night T max 105 given Motrin once then vomited immediately after, after used Acetaminophen supp.  Drank 4 oz milk.  Refusing steroid, nose better, cough same.  No sign of respiratory distress.

## 2025-06-23 NOTE — DISCUSSION/SUMMARY
[FreeTextEntry1] : 15 mo female with Entero/rhinovirus, fever, URI, cough, RAD.  Throat Cx neg. Continue 120 mg Acetaminophen MN (given in office x 1) or Tylenol 160 mg po Q 4 hours.  If eating and not vomiting may give Motrin 100 mg with food Q 6 hours PRN.  Luke warm bath or luke warm compresses to help with fever.   Albuterol Q 4-6 hours PRN.  Do not give Prednisolone if not eating or vomiting.  Monitor hydration, UO and tears.  Give Pedialyte if not eating and drinking or if vomiting persists.